# Patient Record
Sex: FEMALE | Race: OTHER | NOT HISPANIC OR LATINO | Employment: FULL TIME | URBAN - METROPOLITAN AREA
[De-identification: names, ages, dates, MRNs, and addresses within clinical notes are randomized per-mention and may not be internally consistent; named-entity substitution may affect disease eponyms.]

---

## 2021-12-11 ENCOUNTER — OFFICE VISIT (OUTPATIENT)
Dept: URGENT CARE | Age: 26
End: 2021-12-11
Payer: COMMERCIAL

## 2021-12-11 VITALS
HEART RATE: 69 BPM | RESPIRATION RATE: 18 BRPM | WEIGHT: 184 LBS | SYSTOLIC BLOOD PRESSURE: 132 MMHG | TEMPERATURE: 97.7 F | HEIGHT: 67 IN | BODY MASS INDEX: 28.88 KG/M2 | OXYGEN SATURATION: 99 % | DIASTOLIC BLOOD PRESSURE: 86 MMHG

## 2021-12-11 DIAGNOSIS — R30.0 DYSURIA: Primary | ICD-10-CM

## 2021-12-11 LAB
SL AMB  POCT GLUCOSE, UA: NEGATIVE
SL AMB LEUKOCYTE ESTERASE,UA: ABNORMAL
SL AMB POCT BILIRUBIN,UA: NEGATIVE
SL AMB POCT BLOOD,UA: ABNORMAL
SL AMB POCT CLARITY,UA: CLEAR
SL AMB POCT COLOR,UA: ABNORMAL
SL AMB POCT KETONES,UA: NEGATIVE
SL AMB POCT NITRITE,UA: NEGATIVE
SL AMB POCT PH,UA: 7
SL AMB POCT SPECIFIC GRAVITY,UA: 1
SL AMB POCT URINE HCG: NEGATIVE
SL AMB POCT URINE PROTEIN: NEGATIVE
SL AMB POCT UROBILINOGEN: 0.2

## 2021-12-11 PROCEDURE — 99213 OFFICE O/P EST LOW 20 MIN: CPT | Performed by: PHYSICIAN ASSISTANT

## 2021-12-11 PROCEDURE — 87591 N.GONORRHOEAE DNA AMP PROB: CPT | Performed by: PHYSICIAN ASSISTANT

## 2021-12-11 PROCEDURE — 87086 URINE CULTURE/COLONY COUNT: CPT | Performed by: PHYSICIAN ASSISTANT

## 2021-12-11 PROCEDURE — 87491 CHLMYD TRACH DNA AMP PROBE: CPT | Performed by: PHYSICIAN ASSISTANT

## 2021-12-11 PROCEDURE — 81002 URINALYSIS NONAUTO W/O SCOPE: CPT | Performed by: PHYSICIAN ASSISTANT

## 2021-12-11 PROCEDURE — 81025 URINE PREGNANCY TEST: CPT | Performed by: PHYSICIAN ASSISTANT

## 2021-12-11 RX ORDER — CEPHALEXIN 500 MG/1
500 CAPSULE ORAL EVERY 12 HOURS SCHEDULED
Qty: 14 CAPSULE | Refills: 0 | Status: SHIPPED | OUTPATIENT
Start: 2021-12-11 | End: 2021-12-18

## 2021-12-11 RX ORDER — ETONOGESTREL AND ETHINYL ESTRADIOL 11.7; 2.7 MG/1; MG/1
1 INSERT, EXTENDED RELEASE VAGINAL
COMMUNITY

## 2021-12-11 RX ORDER — NITROFURANTOIN 25; 75 MG/1; MG/1
CAPSULE ORAL
COMMUNITY
Start: 2021-10-25

## 2021-12-13 LAB — BACTERIA UR CULT: NORMAL

## 2021-12-14 LAB
C TRACH DNA SPEC QL NAA+PROBE: NEGATIVE
N GONORRHOEA DNA SPEC QL NAA+PROBE: NEGATIVE

## 2023-01-15 ENCOUNTER — OFFICE VISIT (OUTPATIENT)
Dept: URGENT CARE | Age: 28
End: 2023-01-15

## 2023-01-15 VITALS
HEIGHT: 68 IN | OXYGEN SATURATION: 99 % | HEART RATE: 68 BPM | RESPIRATION RATE: 18 BRPM | WEIGHT: 184.2 LBS | BODY MASS INDEX: 27.92 KG/M2 | TEMPERATURE: 97.7 F

## 2023-01-15 DIAGNOSIS — H69.81 EUSTACHIAN TUBE DYSFUNCTION, RIGHT: ICD-10-CM

## 2023-01-15 DIAGNOSIS — J01.00 ACUTE NON-RECURRENT MAXILLARY SINUSITIS: Primary | ICD-10-CM

## 2023-01-15 NOTE — PROGRESS NOTES
[facility]        NAME: Joshua Burt is a 32 y o  female  : 1995    MRN: [id number]  DATE: January 15, 2023  TIME: 10:47 AM    Assessment and Plan   Acute non-recurrent maxillary sinusitis [J01 00]  1  Acute non-recurrent maxillary sinusitis  pseudoephedrine-dextromethorphan-guaifenesin (ROBITUSSIN-PE) 30- MG/5ML solution      2  Eustachian tube dysfunction, right  pseudoephedrine-dextromethorphan-guaifenesin (ROBITUSSIN-PE) 30- MG/5ML solution            Patient Instructions     Decongestant containing pseudoephedrine such as Robitussin PE or Mucinex D or Sudafed PE will help, but may make you drowsy  Decongestants containing phenylephrine such as Sudafed or Dayquil should not make you drowsy but may be less effective  Chew gum or yawn actively throughout the day  To soothe sore throat: Tea with honey, salt water gargles 4 times day (1/2 teaspoon of salt in 8ox water), and/or Chloraseptic throat spray  Tylenol or ibuprofen for fever  Follow up with PCP in 5-7 days  Proceed to  ER if symptoms worsen significantly  Chief Complaint     Chief Complaint   Patient presents with   • Earache     Right ear pain, feels blocked, congestion and sore throat x2 days         History of Present Illness       This is a 58-year-old female presenting with 2-day history of right earache, congestion, sore throat, runny nose, postnasal drip, and sinus pressure  Patient has tried Mucinex sinus max at home to help alleviate the symptoms  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  Patient states she feels pressure and pain in the right ear since the symptoms began and was worried about an ear infection  Patient denies cough, fever, chill, chest pain, or shortness of breath  Review of Systems   Review of Systems   Constitutional: Negative for chills and fever     HENT: Positive for congestion, ear pain, postnasal drip, rhinorrhea, sinus pressure and sore throat  Eyes: Negative for pain and visual disturbance  Respiratory: Negative for cough and shortness of breath  Cardiovascular: Negative for chest pain and palpitations  Gastrointestinal: Negative for abdominal pain and vomiting  Genitourinary: Negative for dysuria and hematuria  Musculoskeletal: Negative for arthralgias and back pain  Skin: Negative for color change and rash  Neurological: Negative for seizures and syncope  All other systems reviewed and are negative  Current Medications       Current Outpatient Medications:   •  pseudoephedrine-dextromethorphan-guaifenesin (ROBITUSSIN-PE) 30- MG/5ML solution, Take 10 mL by mouth 4 (four) times a day as needed for allergies, Disp: 118 mL, Rfl: 0  •  etonogestrel-ethinyl estradiol (NUVARING) 0 12-0 015 MG/24HR vaginal ring, Insert 1 each into the vagina every 28 days Insert vaginally and leave in place for 3 consecutive weeks, then remove for 1 week  (Patient not taking: Reported on 1/15/2023), Disp: , Rfl:   •  nitrofurantoin (MACROBID) 100 mg capsule, , Disp: , Rfl:     Current Allergies     Allergies as of 01/15/2023   • (No Known Allergies)            The following portions of the patient's history were reviewed and updated as appropriate: allergies, current medications, past family history, past medical history, past social history, past surgical history and problem list      History reviewed  No pertinent past medical history  History reviewed  No pertinent surgical history  History reviewed  No pertinent family history  Medications have been verified  Objective   Pulse 68   Temp 97 7 °F (36 5 °C)   Resp 18   Ht 5' 8" (1 727 m)   Wt 83 6 kg (184 lb 3 2 oz)   LMP 12/20/2022 Comment: Having periods  SpO2 99%   BMI 28 01 kg/m²   Patient's last menstrual period was 12/20/2022  Physical Exam     Physical Exam  Constitutional:       General: She is not in acute distress       Appearance: Normal appearance  She is normal weight  She is not ill-appearing  HENT:      Head: Normocephalic and atraumatic  Right Ear: Ear canal and external ear normal  A middle ear effusion is present  Tympanic membrane is not injected, perforated, erythematous or bulging  Left Ear: Ear canal and external ear normal  A middle ear effusion is present  Tympanic membrane is not injected, perforated, erythematous or bulging  Ears:      Comments: Right mid ear effusion greater than left  No erythema or signs of active infection     Nose: Congestion and rhinorrhea present  Right Sinus: No maxillary sinus tenderness or frontal sinus tenderness  Left Sinus: Maxillary sinus tenderness present  No frontal sinus tenderness  Mouth/Throat:      Mouth: Mucous membranes are moist       Pharynx: Oropharynx is clear  Posterior oropharyngeal erythema present  No oropharyngeal exudate  Eyes:      Extraocular Movements: Extraocular movements intact  Conjunctiva/sclera: Conjunctivae normal       Pupils: Pupils are equal, round, and reactive to light  Cardiovascular:      Rate and Rhythm: Normal rate and regular rhythm  Heart sounds: Normal heart sounds  No murmur heard  Pulmonary:      Effort: Pulmonary effort is normal  No respiratory distress  Breath sounds: Normal breath sounds  No stridor  No wheezing, rhonchi or rales  Abdominal:      General: Abdomen is flat  Bowel sounds are normal       Palpations: Abdomen is soft  Tenderness: There is no abdominal tenderness  Musculoskeletal:         General: Normal range of motion  Cervical back: Normal range of motion and neck supple  No rigidity or tenderness  Right lower leg: No edema  Left lower leg: No edema  Lymphadenopathy:      Cervical: No cervical adenopathy  Skin:     General: Skin is warm and dry  Capillary Refill: Capillary refill takes less than 2 seconds  Neurological:      General: No focal deficit present  Mental Status: She is alert and oriented to person, place, and time  Mental status is at baseline  Psychiatric:         Mood and Affect: Mood normal          Behavior: Behavior normal          Thought Content:  Thought content normal          Judgment: Judgment normal            Haroon Pino PA-C

## 2023-01-15 NOTE — PATIENT INSTRUCTIONS
Decongestant containing pseudoephedrine such as Robitussin PE or Mucinex D or Sudafed PE will help, but may make you drowsy  Decongestants containing phenylephrine such as Sudafed or Dayquil should not make you drowsy but may be less effective  Chew gum or yawn actively throughout the day  To soothe sore throat: Tea with honey, salt water gargles 4 times day (1/2 teaspoon of salt in 8ox water), and/or Chloraseptic throat spray  Tylenol or ibuprofen for fever  Follow up with PCP in 5-7 days  Proceed to  ER if symptoms worsen significantly

## 2023-06-09 ENCOUNTER — OFFICE VISIT (OUTPATIENT)
Dept: URGENT CARE | Facility: CLINIC | Age: 28
End: 2023-06-09
Payer: COMMERCIAL

## 2023-06-09 VITALS
DIASTOLIC BLOOD PRESSURE: 75 MMHG | HEART RATE: 79 BPM | BODY MASS INDEX: 26.98 KG/M2 | HEIGHT: 68 IN | OXYGEN SATURATION: 95 % | TEMPERATURE: 97.9 F | RESPIRATION RATE: 18 BRPM | WEIGHT: 178 LBS | SYSTOLIC BLOOD PRESSURE: 112 MMHG

## 2023-06-09 DIAGNOSIS — J20.9 ACUTE BRONCHITIS, UNSPECIFIED ORGANISM: Primary | ICD-10-CM

## 2023-06-09 PROCEDURE — G0382 LEV 3 HOSP TYPE B ED VISIT: HCPCS | Performed by: NURSE PRACTITIONER

## 2023-06-09 PROCEDURE — S9083 URGENT CARE CENTER GLOBAL: HCPCS | Performed by: NURSE PRACTITIONER

## 2023-06-09 RX ORDER — AZITHROMYCIN 250 MG/1
TABLET, FILM COATED ORAL
Qty: 6 TABLET | Refills: 0 | Status: SHIPPED | OUTPATIENT
Start: 2023-06-09 | End: 2023-06-13

## 2023-06-09 RX ORDER — ALBUTEROL SULFATE 90 UG/1
2 AEROSOL, METERED RESPIRATORY (INHALATION) EVERY 6 HOURS PRN
Qty: 6.7 G | Refills: 0 | Status: SHIPPED | OUTPATIENT
Start: 2023-06-09

## 2023-06-09 RX ORDER — PREDNISONE 20 MG/1
20 TABLET ORAL 2 TIMES DAILY WITH MEALS
Qty: 10 TABLET | Refills: 0 | Status: SHIPPED | OUTPATIENT
Start: 2023-06-09 | End: 2023-06-14

## 2023-06-09 NOTE — PROGRESS NOTES
[facility]        NAME: Caitlyn Chaudhari is a 29 y o  female  : 1995    MRN: [id number]  DATE: 2023  TIME: 12:23 PM    Assessment and Plan   Acute bronchitis, unspecified organism [J20 9]  1  Acute bronchitis, unspecified organism  azithromycin (ZITHROMAX) 250 mg tablet    albuterol (Proventil HFA) 90 mcg/act inhaler    predniSONE 20 mg tablet            Patient Instructions     Patient Instructions   Take medication as directed  Rest and drink plenty of fluids  A cool mist humidifier can be helpful  If you develop a worsening cough, chest pain, shortness of breath, palpitations, coughing up blood, prolonged high fever, any new or concerning symptoms please return or proceed ER  Recommend following up with PCP in 3-5 days        Chief Complaint     Chief Complaint   Patient presents with   • Cough     X 2-3 weeks  History of Present Illness       Cough  This is a new problem  Episode onset: 3 weeks ago  The problem has been unchanged  The problem occurs every few minutes  The cough is productive of sputum  Associated symptoms include wheezing  Pertinent negatives include no chest pain, chills, ear pain, fever, headaches, hemoptysis, myalgias, nasal congestion, postnasal drip, rash, rhinorrhea, sore throat or shortness of breath  Nothing aggravates the symptoms  Treatments tried: zyrtec  The treatment provided mild relief  There is no history of asthma  Review of Systems   Review of Systems   Constitutional: Negative for chills, diaphoresis, fatigue and fever  HENT: Positive for congestion  Negative for ear pain, facial swelling, postnasal drip, rhinorrhea, sinus pressure, sinus pain, sore throat, tinnitus and trouble swallowing  Eyes: Negative  Respiratory: Positive for cough and wheezing  Negative for hemoptysis, chest tightness, shortness of breath and stridor  Cardiovascular: Negative for chest pain and palpitations  Gastrointestinal: Negative  "  Musculoskeletal: Negative for arthralgias, back pain, joint swelling, myalgias, neck pain and neck stiffness  Skin: Negative for rash  Neurological: Negative for dizziness, facial asymmetry, weakness, light-headedness, numbness and headaches  Current Medications       Current Outpatient Medications:   •  albuterol (Proventil HFA) 90 mcg/act inhaler, Inhale 2 puffs every 6 (six) hours as needed for wheezing, Disp: 6 7 g, Rfl: 0  •  azithromycin (ZITHROMAX) 250 mg tablet, Take 2 tablets today then 1 tablet daily x 4 days, Disp: 6 tablet, Rfl: 0  •  predniSONE 20 mg tablet, Take 1 tablet (20 mg total) by mouth 2 (two) times a day with meals for 5 days, Disp: 10 tablet, Rfl: 0  •  etonogestrel-ethinyl estradiol (NUVARING) 0 12-0 015 MG/24HR vaginal ring, Insert 1 each into the vagina every 28 days Insert vaginally and leave in place for 3 consecutive weeks, then remove for 1 week  (Patient not taking: Reported on 1/15/2023), Disp: , Rfl:   •  nitrofurantoin (MACROBID) 100 mg capsule, , Disp: , Rfl:   •  pseudoephedrine-dextromethorphan-guaifenesin (ROBITUSSIN-PE) 30- MG/5ML solution, Take 10 mL by mouth 4 (four) times a day as needed for allergies (Patient not taking: Reported on 6/9/2023), Disp: 118 mL, Rfl: 0    Current Allergies     Allergies as of 06/09/2023   • (No Known Allergies)            The following portions of the patient's history were reviewed and updated as appropriate: allergies, current medications, past family history, past medical history, past social history, past surgical history and problem list      No past medical history on file  History reviewed  No pertinent surgical history  History reviewed  No pertinent family history  Medications have been verified          Objective   /75   Pulse 79   Temp 97 9 °F (36 6 °C) (Temporal)   Resp 18   Ht 5' 8\" (1 727 m)   Wt 80 7 kg (178 lb)   LMP  (Within Months) Comment: Patient being seen by OBGYN for missed " periods, negative pregnancy 6/9/23  SpO2 95%   BMI 27 06 kg/m²   No LMP recorded (within months)  Physical Exam     Physical Exam  Constitutional:       General: She is not in acute distress  Appearance: She is well-developed  She is not diaphoretic  HENT:      Head: Normocephalic and atraumatic  Right Ear: Hearing, tympanic membrane, ear canal and external ear normal       Left Ear: Hearing, tympanic membrane, ear canal and external ear normal       Nose: Mucosal edema present  Right Sinus: Maxillary sinus tenderness present  No frontal sinus tenderness  Left Sinus: Maxillary sinus tenderness present  No frontal sinus tenderness  Mouth/Throat:      Pharynx: Uvula midline  Cardiovascular:      Rate and Rhythm: Normal rate and regular rhythm  Heart sounds: Normal heart sounds, S1 normal and S2 normal    Pulmonary:      Effort: Pulmonary effort is normal       Breath sounds: Normal air entry  Examination of the right-lower field reveals wheezing  Examination of the left-lower field reveals wheezing  Wheezing (mild expiratory) present  Lymphadenopathy:      Cervical: No cervical adenopathy  Skin:     General: Skin is warm and dry  Neurological:      Mental Status: She is alert and oriented to person, place, and time

## 2023-06-09 NOTE — PATIENT INSTRUCTIONS
Take medication as directed  Rest and drink plenty of fluids  A cool mist humidifier can be helpful  If you develop a worsening cough, chest pain, shortness of breath, palpitations, coughing up blood, prolonged high fever, any new or concerning symptoms please return or proceed ER    Recommend following up with PCP in 3-5 days show

## 2023-11-16 ENCOUNTER — AMB VIDEO VISIT (OUTPATIENT)
Dept: OTHER | Facility: HOSPITAL | Age: 28
End: 2023-11-16
Payer: COMMERCIAL

## 2023-11-16 DIAGNOSIS — N39.0 URINARY TRACT INFECTION WITHOUT HEMATURIA, SITE UNSPECIFIED: Primary | ICD-10-CM

## 2023-11-16 PROCEDURE — 99212 OFFICE O/P EST SF 10 MIN: CPT | Performed by: NURSE PRACTITIONER

## 2023-11-16 RX ORDER — NITROFURANTOIN 25; 75 MG/1; MG/1
100 CAPSULE ORAL 2 TIMES DAILY
Qty: 10 CAPSULE | Refills: 0 | Status: SHIPPED | OUTPATIENT
Start: 2023-11-16 | End: 2023-11-21

## 2023-11-17 ENCOUNTER — AMB VIDEO VISIT (OUTPATIENT)
Dept: OTHER | Facility: HOSPITAL | Age: 28
End: 2023-11-17

## 2023-11-17 NOTE — PATIENT INSTRUCTIONS
Urine culture ordered. Start antibiotic. Take probiotic. Increase oral fluids. Tylenol or Motrin for pain or fever. Azo for bladder spasms. Follow up with PCP if no improvement. Go to ER with abd pain, flank pain or worsening symptoms. Urinary Tract Infection in Women   WHAT YOU NEED TO KNOW:   A urinary tract infection (UTI) is caused by bacteria that get inside your urinary tract. Most bacteria that enter your urinary tract come out when you urinate. If the bacteria stay in your urinary tract, you may get an infection. Your urinary tract includes your kidneys, ureters, bladder, and urethra. Urine is made in your kidneys, and it flows from the ureters to the bladder. Urine leaves the bladder through the urethra. A UTI is more common in your lower urinary tract, which includes your bladder and urethra. DISCHARGE INSTRUCTIONS:   Return to the emergency department if:   You are urinating very little or not at all. You have a high fever with shaking chills. You have side or back pain that gets worse. Contact your healthcare provider if:   You have a fever. You do not feel better after 2 days of taking antibiotics. You are vomiting. You have questions or concerns about your condition or care. Medicines:   Antibiotics  help fight a bacterial infection. Medicines  may be given to decrease pain and burning when you urinate. They will also help decrease the feeling that you need to urinate often. These medicines will make your urine orange or red. Take your medicine as directed. Contact your healthcare provider if you think your medicine is not helping or if you have side effects. Tell him or her if you are allergic to any medicine. Keep a list of the medicines, vitamins, and herbs you take. Include the amounts, and when and why you take them. Bring the list or the pill bottles to follow-up visits. Carry your medicine list with you in case of an emergency.   Follow up with your healthcare provider as directed:  Write down your questions so you remember to ask them during your visits. Prevent another UTI:   Empty your bladder often. Urinate and empty your bladder as soon as you feel the need. Do not hold your urine for long periods of time. Wipe from front to back after you urinate or have a bowel movement. This will help prevent germs from getting into your urinary tract through your urethra. Drink liquids as directed. Ask how much liquid to drink each day and which liquids are best for you. You may need to drink more liquids than usual to help flush out the bacteria. Do not drink alcohol, caffeine, or citrus juices. These can irritate your bladder and increase your symptoms. Your healthcare provider may recommend cranberry juice to help prevent a UTI. Urinate after you have sex. This can help flush out bacteria passed during sex. Do not douche or use feminine deodorants. These can change the chemical balance in your vagina. Change sanitary pads or tampons often. This will help prevent germs from getting into your urinary tract. Do pelvic muscle exercises often. Pelvic muscle exercises may help you start and stop urinating. Strong pelvic muscles may help you empty your bladder easier. Squeeze these muscles tightly for 5 seconds like you are trying to hold back urine. Then relax for 5 seconds. Gradually work up to squeezing for 10 seconds. Do 3 sets of 15 repetitions a day, or as directed. © 2017 [address] [place] [address] [place] [employer] Information is for End User's use only and may not be sold, redistributed or otherwise used for commercial purposes. All illustrations and images included in CareNotes® are the copyrighted property of A.D.A.M., Inc. or [employer]. The above information is an  only. It is not intended as medical advice for individual conditions or treatments.  Talk to your doctor, nurse or pharmacist before following any medical regimen to see if it is safe and effective for you.

## 2023-11-17 NOTE — CARE ANYWHERE EVISITS
Visit Summary for [facility] . [facility] - Gender: Female - Date of Birth: [id number]  Date: [id number] - Duration: 5 minutes  Patient: [facility] . [facility]  Provider: Deshawn CLEVELAND    Patient Contact Information  Address  [address] [place]; [address]  [phone]    Visit Topics  Uti symptoms  [Added By: Self - 2023-11-17]    Triage Questions   What is your current physical address in the event of a medical emergency? Answer []  Are you allergic to any medications? Answer []  Are you now or could you be pregnant? Answer []  Do you have any immune system compromise or chronic lung   disease? Answer []  Do you have any vulnerable family members in the home (infant, pregnant, cancer, elderly)? Answer []     Conversation Transcripts  [0A][0A] [Notification] You are connected with Gomez Hays, Urgent Care Specialist.[0A][Notification] Cris Simpson is located in [place]. [0A][Notification] Cris Simpson has shared health history. Sandeep Catherine[0A]    Diagnosis  Urinary tract infection, site not specified    Procedures  Value: 35300 Code: CPT-4 UNLISTED E&M SERVICE    Medications Prescribed    No prescriptions ordered    Electronically signed by: Gomez Paez(NPI [id number])

## 2023-11-17 NOTE — PROGRESS NOTES
Video Visit - Cris Simpson 29 y.o. female MRN: [id number]    REQUIRED DOCUMENTATION:         1. This service was provided via [employer]. 2. Provider located at [address] [place] [address] [address]  [phone].  3. [facility] provider: [address]. 4. Identify all parties in room with [facility]:  Patient and significant other . 5. After connecting through televideo, patient was identified by name and date of birth. Patient was then informed that this was a Telemedicine visit and that the exam was being conducted confidentially over secure lines. My office door was closed. No one else was in the room. Patient acknowledged consent and understanding of privacy and security of the Telemedicine visit. I informed the patient that I have reviewed their record in Epic and presented the opportunity for them to ask any questions regarding the visit today. The patient agreed to participate. This is a 29year old female here today for video visit. She states he has been having a burning sensation that started yesterday. She states it is day 3 of her periods. She is unsure if some irration. No redness or discharge. She is having the urgency to pee. She states she feels likes she has urge to pee more often. NO abd pain or back pain. Review of Systems   Constitutional: Negative. Respiratory: Negative. Genitourinary:  Positive for frequency and urgency. Neurological: Negative. Psychiatric/Behavioral: Negative. There were no vitals filed for this visit. Physical Exam  Constitutional:       General: She is not in acute distress. Appearance: Normal appearance. She is not ill-appearing or toxic-appearing. HENT:      Head: Normocephalic and atraumatic. Pulmonary:      Effort: Pulmonary effort is normal. No respiratory distress. Neurological:      Mental Status: She is alert and oriented to person, place, and time. Psychiatric:         Mood and Affect: Mood normal.         Behavior: Behavior normal.         Thought Content: Thought content normal.         Judgment: Judgment normal.       Diagnoses and all orders for this visit:    Urinary tract infection without hematuria, site unspecified  -     nitrofurantoin (MACROBID) 100 mg capsule; Take 1 capsule (100 mg total) by mouth 2 (two) times a day for 5 days  -     Urine culture; Future      Patient Instructions   Urine culture ordered. Start antibiotic. Take probiotic. Increase oral fluids. Tylenol or Motrin for pain or fever. Azo for bladder spasms. Follow up with PCP if no improvement. Go to ER with abd pain, flank pain or worsening symptoms. Urinary Tract Infection in Women   WHAT YOU NEED TO KNOW:   A urinary tract infection (UTI) is caused by bacteria that get inside your urinary tract. Most bacteria that enter your urinary tract come out when you urinate. If the bacteria stay in your urinary tract, you may get an infection. Your urinary tract includes your kidneys, ureters, bladder, and urethra. Urine is made in your kidneys, and it flows from the ureters to the bladder. Urine leaves the bladder through the urethra. A UTI is more common in your lower urinary tract, which includes your bladder and urethra. DISCHARGE INSTRUCTIONS:   Return to the emergency department if:   You are urinating very little or not at all. You have a high fever with shaking chills. You have side or back pain that gets worse. Contact your healthcare provider if:   You have a fever. You do not feel better after 2 days of taking antibiotics. You are vomiting. You have questions or concerns about your condition or care. Medicines:   Antibiotics  help fight a bacterial infection. Medicines  may be given to decrease pain and burning when you urinate. They will also help decrease the feeling that you need to urinate often.  These medicines will make your urine orange or red. Take your medicine as directed. Contact your healthcare provider if you think your medicine is not helping or if you have side effects. Tell him or her if you are allergic to any medicine. Keep a list of the medicines, vitamins, and herbs you take. Include the amounts, and when and why you take them. Bring the list or the pill bottles to follow-up visits. Carry your medicine list with you in case of an emergency. Follow up with your healthcare provider as directed:  Write down your questions so you remember to ask them during your visits. Prevent another UTI:   Empty your bladder often. Urinate and empty your bladder as soon as you feel the need. Do not hold your urine for long periods of time. Wipe from front to back after you urinate or have a bowel movement. This will help prevent germs from getting into your urinary tract through your urethra. Drink liquids as directed. Ask how much liquid to drink each day and which liquids are best for you. You may need to drink more liquids than usual to help flush out the bacteria. Do not drink alcohol, caffeine, or citrus juices. These can irritate your bladder and increase your symptoms. Your healthcare provider may recommend cranberry juice to help prevent a UTI. Urinate after you have sex. This can help flush out bacteria passed during sex. Do not douche or use feminine deodorants. These can change the chemical balance in your vagina. Change sanitary pads or tampons often. This will help prevent germs from getting into your urinary tract. Do pelvic muscle exercises often. Pelvic muscle exercises may help you start and stop urinating. Strong pelvic muscles may help you empty your bladder easier. Squeeze these muscles tightly for 5 seconds like you are trying to hold back urine. Then relax for 5 seconds. Gradually work up to squeezing for 10 seconds. Do 3 sets of 15 repetitions a day, or as directed.   © 2017 [facility] [address] is for End User's use only and may not be sold, redistributed or otherwise used for commercial purposes. All illustrations and images included in CareNotes® are the copyrighted property of [employer].A[employer]M., Inc. or [employer]. The above information is an  only. It is not intended as medical advice for individual conditions or treatments. Talk to your doctor, nurse or pharmacist before following any medical regimen to see if it is safe and effective for you. Follow up with PCP if not improved, if symptoms are worse, go to the ER.